# Patient Record
Sex: MALE | Race: WHITE
[De-identification: names, ages, dates, MRNs, and addresses within clinical notes are randomized per-mention and may not be internally consistent; named-entity substitution may affect disease eponyms.]

---

## 2020-12-21 ENCOUNTER — TRANSCRIPTION ENCOUNTER (OUTPATIENT)
Age: 65
End: 2020-12-21

## 2020-12-21 PROBLEM — Z00.00 ENCOUNTER FOR PREVENTIVE HEALTH EXAMINATION: Status: ACTIVE | Noted: 2020-12-21

## 2020-12-23 ENCOUNTER — APPOINTMENT (OUTPATIENT)
Dept: DISASTER EMERGENCY | Facility: HOSPITAL | Age: 65
End: 2020-12-23

## 2020-12-24 ENCOUNTER — TRANSCRIPTION ENCOUNTER (OUTPATIENT)
Age: 65
End: 2020-12-24

## 2023-07-13 ENCOUNTER — APPOINTMENT (OUTPATIENT)
Dept: AFTER HOURS CARE | Facility: EMERGENCY ROOM | Age: 68
End: 2023-07-13
Payer: SELF-PAY

## 2023-07-14 DIAGNOSIS — D22.9 MELANOCYTIC NEVI, UNSPECIFIED: ICD-10-CM

## 2023-07-14 DIAGNOSIS — R23.3 MELANOCYTIC NEVI, UNSPECIFIED: ICD-10-CM

## 2023-07-14 PROCEDURE — 99203 OFFICE O/P NEW LOW 30 MIN: CPT | Mod: 95

## 2023-07-19 NOTE — HISTORY OF PRESENT ILLNESS
[Home] : at home, [unfilled] , at the time of the visit. [Other Location: e.g. Home (Enter Location, City,State)___] : at [unfilled] [Family Member] : family member [Verbal consent obtained from patient] : the patient, [unfilled] [FreeTextEntry8] : 67 y M HLD, HTN, DM2, PCI c stent to LAD 5/30/23 now on plavix and asa 81.  Had mole on L dorsal proximal forearm removed ~12 hrs ago and since then wound has been oozing.  Pt had not been holding pressure until ~3-4 hours ago.  Daughter at patient's side (an RN) applied gauze and has been placing dressings, which pt has bled through.  No pulsatile bleeding.  No paresthesias.   No lightheadedness, fatigue, malaise, SOB, CP.  ambulating without difficulty.  No other complaints.\par Daughter showed the most recent dressing that she had removed, showed moderate saturation and apparent blood clot.\par \par L forearm -- van to quarter sized lesion on dorsal proximal aspect, minimally oozing at center of lesion.  Nothing pulsatile.

## 2023-07-19 NOTE — ASSESSMENT
[FreeTextEntry1] : Forearm bleeding 12 hours after biopsy.  Appears venous.  No constructional symptoms and blood on discarded gauze pads does not appear clinically significant.  No indication for ED eval at this time as daughter should have sufficient wound dressing equipment to obtain hemostasis after education.

## 2023-07-19 NOTE — PLAN
[FreeTextEntry1] : Hemostasis instructions provided, specifically reducing number of gauze dressings and apply continuous direct digital pressure for 10-15 minutes.  After, can dress c 4x4s and compressive wrap (avoidance of paresthesias, swelling, etc precautions provided).  If wound continues to ooze despite adequate direct compression it would be a good idea to go to the ED for eval and likely application of gelfoam, txa, etc if needed.  Daughter and patient understood this plan, appreciative of recommendations.  Will f/u c dermatologist first thing in the AM.

## 2024-07-05 ENCOUNTER — RESULT REVIEW (OUTPATIENT)
Age: 69
End: 2024-07-05

## 2024-07-05 ENCOUNTER — TRANSCRIPTION ENCOUNTER (OUTPATIENT)
Age: 69
End: 2024-07-05

## 2025-04-07 ENCOUNTER — TRANSCRIPTION ENCOUNTER (OUTPATIENT)
Age: 70
End: 2025-04-07

## 2025-09-09 ENCOUNTER — RESULT REVIEW (OUTPATIENT)
Age: 70
End: 2025-09-09